# Patient Record
Sex: FEMALE | Race: WHITE
[De-identification: names, ages, dates, MRNs, and addresses within clinical notes are randomized per-mention and may not be internally consistent; named-entity substitution may affect disease eponyms.]

---

## 2017-04-03 ENCOUNTER — HOSPITAL ENCOUNTER (EMERGENCY)
Dept: HOSPITAL 68 - ERH | Age: 52
End: 2017-04-03
Payer: COMMERCIAL

## 2017-04-03 VITALS — HEIGHT: 68 IN | WEIGHT: 170 LBS | BODY MASS INDEX: 25.76 KG/M2

## 2017-04-03 VITALS — SYSTOLIC BLOOD PRESSURE: 118 MMHG | DIASTOLIC BLOOD PRESSURE: 82 MMHG

## 2017-04-03 DIAGNOSIS — Z72.0: ICD-10-CM

## 2017-04-03 DIAGNOSIS — J40: Primary | ICD-10-CM

## 2017-04-03 NOTE — RADIOLOGY REPORT
EXAMINATION:
XR CHEST
 
CLINICAL INFORMATION:
51-year-old patient with productive cough.
 
COMPARISON:
Chest x-ray on 11/26/2007.
 
TECHNIQUE:
PA and lateral erect views of the chest were obtained.
 
FINDINGS:
No significant abnormality is noted involving the heart, lungs, mediastinum,
bony thorax or soft tissues.
 
IMPRESSION:
No pneumonia.

## 2017-04-03 NOTE — ED DYSPNEA/ASTHMA COMPLAINT
History of Present Illness
 
General
Chief Complaint: Wheezing/Asthma
Stated Complaint: "IM HAVING HARD TIME BREATHING" SAT 94 P120
Source: patient
Exam Limitations: no limitations
 
Vital Signs & Intake/Output
Vital Signs & Intake/Output
 Vital Signs
 
 
Date Time Temp Pulse Resp B/P Pulse O2 O2 Flow FiO2
 
     Ox Delivery Rate 
 
 1606 96.9 92 18 118/82 94 Room Air  
 
 1519     97   
 
/ 1446     96 Room Air  
 
 1335 98.8 112 18 119/86 91 Room Air  
 
 
Allergies
Coded Allergies:
fish derived (UNKNOWN 17)
penicillin V (UNKNOWN 17)
 
Reconcile Medications
Albuterol Sulfate (Ventolin Hfa) 90 MCG HFA.AER.AD   2 PUF INH Q4-6 PRN PRN 
WHEEZING/SHORTNESS OF BREATH
Benzonatate 200 MG CAPSULE   1 CAP PO TID PRN cough
Methadone HCl 10 MG/ML ORAL.CONC   50 MG PO DAILY MAINTENCE  (Reported)
Methylprednisolone. (Medrol) 4 MG TAB.DS.PK   1 DP PO AD breathing
 
Triage Note:
C/O DIFFICULTY BREATHING SINCE SATURDAY.
 PRODUCTIVE COUGH- YELLOW SPUTUM. PT SHE HAD CHILLS
 ON AND OFF
Triage Nurses Notes Reviewed? yes
HPI:
Patient is a 51-year-old female presents complaining of sinus pressure, 
postnasal drip, cough with sputum production and shortness of breath.  Symptoms 
onset approximately 5 days ago.  Symptoms currently moderate.  Patient has been 
using her albuterol inhaler with mild improvement.  Also taking DayQuil with 
mild improvement.  Intermittent subjective fevers.  Patient denies sick contacts
, recent travel, chest pain, abdominal pain.
(SUZANNA HO)
 
Past History
 
Travel History
Traveled to [place] past 21 day No
 
Medical History
Any Pertinent Medical History? see below for history
Respiratory: asthma
Psychiatric: opioid dependence (on methadone)
 
Surgical History
Surgical History: non-contributory
 
Psychosocial History
What is your primary language English
Tobacco Use: Current Not Daily
Daily Tobacco Use Amount/Type: =< 4 Cigarettes daily
Illicit Drug Use: history of opiate abuse on methadone
 
Family History
Hx Contributory? No
(SUZANNA HO)
 
Review of Systems
 
Review of Systems
Constitutional:
Reports: fever, malaise. 
EENTM:
Reports: nasal congestion.  Denies: throat pain. 
Respiratory:
Reports: see HPI. 
Cardiovascular:
Denies: chest pain. 
GI:
Denies: abdominal pain, nausea, vomiting. 
Genitourinary:
Reports: no symptoms. 
Musculoskeletal:
Reports: no symptoms. 
Skin:
Reports: no symptoms. 
Neurological/Psychological:
Reports: no symptoms. 
Hematologic/Endocrine:
Reports: no symptoms. 
Immunologic/Allergic:
Reports: no symptoms. 
(SUZANNA HO)
 
Physical Exam
 
Physical Exam
General Appearance: well developed/nourished, alert, awake
Head: atraumatic, normal appearance, mild bilateral maxillary sinus tenderness.
Eyes:
Bilateral: normal appearance, PERRL, EOMI. 
Ears, Nose, Throat: normal pharynx
Neck: normal inspection, supple, full range of motion
Respiratory: diffuse moderate expiratory and expiratory wheezing and rhonchi.
Cardiovascular: regular rate/rhythm, no appreciable murmur
Extremities: normal inspection, normal capillary refill, normal range of motion
Neurologic/Psych: no motor/sensory deficits, awake, alert, oriented x 3, normal 
gait, normal mood/affect
Skin: intact, normal color, warm/dry
Lymphatic: no anterior cervical wiley
 
Core Measures
ACS in differential dx? No
Severe Sepsis Present: No
Septic Shock Present: No
(SUZANNA HO)
 
Progress
Differential Diagnosis: asthma, bronchitis, pneumonia
Plan of Care:
 Orders
 
 
Procedure Date/time Status
 
XRY-CHEST XRAY, PA AND LATERAL 1458 Active
 
 
 Current Medications
 
 
  Sig/Kristine Start time  Last
 
Medication Dose  Stop Time Status Admin
 
Albuterol Sulfate 3 ML ONCE ONE  1500 UNVr 
 
(Proventil)    1501  
 
Ipratropium Bromide 2.5 ML ONCE ONE  1500 UNVr 
 
(Atrovent)    1501  
 
Prednisone 60 MG ONCE ONE  1500 UNVr 
 
   / 1501  
 
 
4/3/2017 3:46:28 PM: Results of chest x-ray discussed with patient.  Patient 
afebrile, nontoxic-appearing, appears stable for discharge.  Patient instructed 
to follow-up with her primary doctor if no improvement within 1-2 days.
(SUZANNA HO)
Diagnostic Imaging:
Viewed by Me: Radiology Read.  Discussed w/RAD: Radiology Read. 
CXR Impression: PATIENT: ILENE GONZALEZ  MEDICAL RECORD NO: [id number] PRESENT AGE:
51  PATIENT ACCOUNT NO: [id number] : 67  LOCATION: [facility] ORDERING PHYSICIAN:
SUZANNA CORTEZ     SERVICE DATE:  EXAM TYPE: RAD - XRY-CHEST 
XRAY, PA AND LATERAL EXAMINATION: XR CHEST CLINICAL INFORMATION: 51-year-old 
patient with productive cough. COMPARISON: Chest x-ray on 2007. TECHNIQUE:
PA and lateral erect views of the chest were obtained. FINDINGS: No significant 
abnormality is noted involving the heart, lungs, mediastinum, bony thorax or 
soft tissues. IMPRESSION: No pneumonia. DICTATED BY: STEVAN ADLER MD  DATE/
TIME DICTATED:17 :RAD.[facility]  DATE/TIME TRANSCRIBED:
17 CONFIDENTIAL, DO NOT COPY WITHOUT APPROPRIATE AUTHORIZATION.  <
Electronically signed in Other Vendor System>                                   
                                                    SIGNED BY: STEVAN ADLER MD 17 1536
Initial ED EKG: none
(SUZANNA HO)
 
Departure
 
Departure
Time of Disposition: 
Disposition: HOME OR SELF CARE
Condition: Stable
Clinical Impression
Primary Impression: Bronchitis
Referrals:
LEDA TAYLOR,MARYA ECHEVERRIA (PCP/Family)
 
Additional Instructions:
Drink plenty of fluids and rest.  Use the albuterol inhaler as directed.  Return
to the emergency department if breathing worsening, unable stay hydrated, or 
worsening of symptoms.
Departure Forms:
Customer Survey
General Discharge Information
Prescriptions:
Current Visit Scripts
Methylprednisolone. (Medrol) 1 DP PO AD 
     #1 DP 
 
Benzonatate 1 CAP PO TID PRN cough
     #30 CAP 
 
Albuterol Sulfate (Ventolin Hfa) 2 PUF INH Q4-6 PRN PRN WHEEZING/SHORTNESS OF 
BREATH
     #1 INHAL 
 
 
(SUZANNA HO)
 
PA/NP Co-Sign Statement
Statement:
ED Attending supervision documentation-
 
[] I saw and evaluated the patient. I have also reviewed all the pertinent lab 
results and diagnostic results. I agree with the findings and the plan of care 
as documented in the PA's/NP's documentation. 
 
[X] I have reviewed the ED Record and agree with the PA's/NP's documentation.
 
[] Additions or exceptions (if any) to the PAs/NP's note and plan are 
summarized below:
[]
 
(KATELYNN MENDEZ DO)
 
Critical Care Note
 
Critical Care Note
Critical Care Time: non-applicable
(SUZANNA HO)

## 2018-06-04 ENCOUNTER — HOSPITAL ENCOUNTER (EMERGENCY)
Dept: HOSPITAL 68 - ERH | Age: 53
End: 2018-06-04
Payer: COMMERCIAL

## 2018-06-04 VITALS — SYSTOLIC BLOOD PRESSURE: 154 MMHG | DIASTOLIC BLOOD PRESSURE: 95 MMHG

## 2018-06-04 DIAGNOSIS — F17.210: ICD-10-CM

## 2018-06-04 DIAGNOSIS — J40: Primary | ICD-10-CM

## 2018-06-04 DIAGNOSIS — J45.909: ICD-10-CM

## 2018-06-04 NOTE — ED DYSPNEA/ASTHMA COMPLAINT
History of Present Illness
 
General
Chief Complaint: Wheezing/Asthma
Stated Complaint: "ASTHMA ATTACK; SPO2 94%"
Source: patient
Exam Limitations: no limitations
 
Vital Signs & Intake/Output
Vital Signs & Intake/Output
 Vital Signs
 
 
Date Time Temp Pulse Resp B/P B/P Pulse O2 O2 Flow FiO2
 
     Mean Ox Delivery Rate 
 
06/04 0430 98.0 92 24 154/95  94 Room Air  
 
 
 
Allergies
Coded Allergies:
fish derived (UNKNOWN 04/03/17)
penicillin V (UNKNOWN 04/03/17)
 
Reconcile Medications
Albuterol Sulfate (Ventolin Hfa) 90 MCG HFA.AER.AD   2 PUF INH Q4-6 PRN PRN 
WHEEZING/COUGH
Albuterol Sulfate (Ventolin Hfa) 90 MCG HFA.AER.AD   2 PUF INH Q4-6 PRN PRN 
WHEEZING/SHORTNESS OF BREATH
Azithromycin (Zithromax) 250 MG TABLET   1 DP PO AD BRONCHITIS
     FIRST DOSE OF Z-PACK GIVEN IN ed
Benzonatate (Tessalon Perle) 100 MG CAPSULE   1 CAP PO TID PRN COUGH
Benzonatate 200 MG CAPSULE   1 CAP PO TID PRN cough
Methadone HCl 10 MG/ML ORAL.CONC   50 MG PO DAILY MAINTENCE  (Reported)
Methylprednisolone. (Medrol) 4 MG TAB.DS.PK   1 DP PO AD breathing
Prednisone 50 MG TABLET   1 TAB PO DAILY BRONCHITIS
 
Triage Note:
PER PT SOB ALL DAY HX OF ASTHMA, REPORTS
 NONPRODUCTIVE COUGH +SMOKER BUT NONE TODAY
Triage Nurses Notes Reviewed? yes
Onset: Gradual
Duration: day(s):, waxing and waning
Timing: recent history
Severity: moderate
Activities at Onset: none
Prior Episodes/Possible Cause: occasional episodes
Associated Symptoms: cough, wheezing
HPI:
52-year-old woman history of asthma current cigarette smoker presents with 7 
days of cough and wheeze.  She notes that the past 2 days her wheezing has 
gotten worse she feels like there is phlegm in her chest.  Tonight he grew 
worse.  She has no fever chills chest pain lower extremity swelling.  She is 
otherwise well and has no other concerns.
 
Past History
 
Travel History
Traveled to [place] past 21 day No
 
Medical History
Any Pertinent Medical History? see below for history
Neurological: NONE
EENT: NONE
Cardiovascular: NONE
Respiratory: asthma
Gastrointestinal: NONE
Hepatic: NONE
Renal: NONE
Musculoskeletal: NONE
Psychiatric: opioid dependence (on methadone)
Endocrine: NONE
Blood Disorders: NONE
Cancer(s): NONE
GYN/Reproductive: NONE
 
Surgical History
Surgical History: non-contributory
 
Psychosocial History
What is your primary language English
Tobacco Use: Current Daily Use
Daily Tobacco Use Amount/Type: => 5 Cigarettes daily
 
Family History
Hx Contributory? No
 
Review of Systems
 
Review of Systems
Constitutional:
Reports: no symptoms. 
EENTM:
Reports: no symptoms. 
Respiratory:
Reports: no symptoms. 
Cardiovascular:
Reports: no symptoms. 
GI:
Reports: no symptoms. 
Genitourinary:
Reports: no symptoms. 
Musculoskeletal:
Reports: no symptoms. 
Skin:
Reports: no symptoms. 
Neurological/Psychological:
Reports: no symptoms. 
Hematologic/Endocrine:
Reports: no symptoms. 
Immunologic/Allergic:
Reports: no symptoms. 
All Other Systems: Reviewed and Negative
 
Physical Exam
 
Physical Exam
Respiratory: see below
Comments:
Review of Systems - except as otherwise noted in HPI
 
All Other Systems: Reviewed and Negative
 
Physical Exam
 
Physical Exam
General Appearance: well developed/nourished, no apparent distress
Head: atraumatic, normal appearance
Eyes:
Bilateral: normal appearance. 
Ears, Nose, Throat: normal pharynx, normal ENT inspection
Neck: normal inspection, supple, full range of motion
Respiratory: normal breath sounds, chest non-tender, no respiratory distress, 
quiet respiration, bilateral wheezes with slight rhonchi and slightly prolonged 
expiratory phase
Cardiovascular: regular rate/rhythm
Gastrointestinal: normal bowel sounds, soft, non-tender, no organomegaly
Back: normal inspection, normal range of motion
Extremities: normal inspection, normal capillary refill, normal range of motion,
no edema
Neurologic/Psych: no motor/sensory deficits, awake, alert, oriented x 3
Skin: intact, normal color, warm/dry
 
Core Measures
ACS in differential dx? No
CVA/TIA Diagnosis No
Sepsis Present: No
Sepsis Focused Exam Completed? No
 
Progress
Differential Diagnosis: asthma, bronchitis, COPD
Plan of Care:
 Current Medications
 
 
  Sig/Kristine Start time  Last
 
Medication Dose  Stop Time Status Admin
 
Albuterol Sulfate 3 ML ONCE ONE 06/04 0445 AC 
 
(Proventil)   06/04 0446  
 
Azithromycin 500 MG ONCE ONE 06/04 0445 UNVr 
 
(Zithromax)   06/04 0446  
 
Ipratropium Bromide 2.5 ML ONCE ONE 06/04 0445 AC 
 
(Atrovent)   06/04 0446  
 
Prednisone 60 MG ONCE ONE 06/04 0445 UNVr 
 
   06/04 0446  
 
 
 
Initial ED EKG: none
 
Departure
 
Departure
Disposition: HOME OR SELF CARE
Condition: Stable
Clinical Impression
Primary Impression: Bronchitis
Secondary Impressions: Asthma
Referrals:
Holly TAYLOR,Everton ECHEVERRIA (PCP/Family)
 
Departure Forms:
Customer Survey
General Discharge Information
Prescriptions:
Current Visit Scripts
Prednisone 1 TAB PO DAILY  
     #4 TAB 
 
Azithromycin (Zithromax) 1 DP PO AD  
     #4 TAB 
     FIRST DOSE OF Z-PACK GIVEN IN ed
 
Albuterol Sulfate (Ventolin Hfa) 2 PUF INH Q4-6 PRN PRN WHEEZING/COUGH 
     #1 INHAL Ref 1
 
Benzonatate (Tessalon Perle) 1 CAP PO TID PRN COUGH 
     #30 CAP 
 
 
Comments
6\4\18, 5:25AM... feeling better after supportive medications... encouraged 
close follow up... wrote for abx, steroids, albuterol.  encouraged smoking 
cessation. 
 
Critical Care Note
 
Critical Care Note
Critical Care Time: non-applicable

## 2018-06-08 ENCOUNTER — HOSPITAL ENCOUNTER (EMERGENCY)
Dept: HOSPITAL 68 - ERH | Age: 53
End: 2018-06-08
Payer: COMMERCIAL

## 2018-06-08 VITALS — DIASTOLIC BLOOD PRESSURE: 79 MMHG | SYSTOLIC BLOOD PRESSURE: 140 MMHG

## 2018-06-08 VITALS — HEIGHT: 68 IN | WEIGHT: 150 LBS | BODY MASS INDEX: 22.73 KG/M2

## 2018-06-08 DIAGNOSIS — J44.1: Primary | ICD-10-CM

## 2018-06-08 DIAGNOSIS — F17.210: ICD-10-CM

## 2018-06-08 LAB
ABSOLUTE GRANULOCYTE CT: 6.7 /CUMM (ref 1.4–6.5)
BASOPHILS # BLD: 0 /CUMM (ref 0–0.2)
BASOPHILS NFR BLD: 0 % (ref 0–2)
EOSINOPHIL # BLD: 0 /CUMM (ref 0–0.7)
EOSINOPHIL NFR BLD: 0 % (ref 0–5)
ERYTHROCYTE [DISTWIDTH] IN BLOOD BY AUTOMATED COUNT: 14 % (ref 11.5–14.5)
GRANULOCYTES NFR BLD: 91.4 % (ref 42.2–75.2)
HCT VFR BLD CALC: 41.6 % (ref 37–47)
LYMPHOCYTES # BLD: 0.5 /CUMM (ref 1.2–3.4)
MCH RBC QN AUTO: 28.4 PG (ref 27–31)
MCHC RBC AUTO-ENTMCNC: 33.5 G/DL (ref 33–37)
MCV RBC AUTO: 84.9 FL (ref 81–99)
MONOCYTES # BLD: 0.1 /CUMM (ref 0.1–0.6)
PLATELET # BLD: 248 /CUMM (ref 130–400)
PMV BLD AUTO: 5.9 FL (ref 7.4–10.4)
RED BLOOD CELL CT: 4.9 /CUMM (ref 4.2–5.4)
WBC # BLD AUTO: 7.3 /CUMM (ref 4.8–10.8)

## 2018-06-08 NOTE — ED DYSPNEA/ASTHMA COMPLAINT
History of Present Illness
 
General
Chief Complaint: Dyspnea (COPD, CHF, Other)
Stated Complaint: SOB
Source: patient, old records
Exam Limitations: no limitations
 
Vital Signs & Intake/Output
Vital Signs & Intake/Output
 Vital Signs
 
 
Date Time Temp Pulse Resp B/P B/P Pulse O2 O2 Flow FiO2
 
     Mean Ox Delivery Rate 
 
06/08 1916 98.9 87 20 140/79  93 Room Air  
 
06/08 1628       Room Air  
 
06/08 1544      89   
 
06/08 1525   22   89 Room Air Room Air 
 
06/08 1524 97.9 103 18 157/98  90 Room Air  
 
 
 
Allergies
Coded Allergies:
penicillin V (UNKNOWN 04/03/17)
shellfish derived (UNKNOWN 06/08/18)
 
Reconcile Medications
Albuterol Sulfate (Proventil Hfa) 90 MCG HFA.AER.AD   2 PUF INH Q4 COPD
Prednisone 20 MG TABLET   2 TAB PO DAILY COPD
 
Triage Note:
51 YO FEMALE TO TRIAGE C/O SOB. STATES WAS HERE ON
 MONDAY AND WAS GIVEN PREDNISONE AND ANTITIOICS.
 STATES SHE FEELS NO BETTER. PT SOB IN TRIAGE, RA
 SATS 90%. PT ABLE TO SPEAK FULL, CLEAR SENTANCES
 WITHOUT DIFFICULTIES. PA IN TRIAGE FOR EVAL
Triage Nurses Notes Reviewed? yes
HPI:
52F PMH asthma, active smoker presents with shortness of breath, dyspnea on 
exertion, fatigue, and wheezing.  Came to ER for an asthma exacerbation 4 days 
ago, given nebulizer treatment and discharged on Prednisone and Azithromycin, 
which she has taken.  Since then, she is still very short of breath in the 
mornings, and becomes short of breath with minimal exertion.  She denies chest 
pain, palpitations, lightheadedness.  She has no personal or family cardiac 
history.  Never been intubated, speaking in full sentences though becomes 
visibly dyspneic when speaking uninterrupted for some time.
 
Past History
 
Travel History
Traveled to [place] past 21 day No
 
Medical History
Any Pertinent Medical History? see below for history
Neurological: NONE
EENT: NONE
Cardiovascular: NONE
Respiratory: asthma
Gastrointestinal: NONE
Hepatic: NONE
Renal: NONE
Musculoskeletal: NONE
Psychiatric: opioid dependence (on methadone)
Endocrine: NONE
Blood Disorders: NONE
Cancer(s): NONE
GYN/Reproductive: NONE
 
Surgical History
Surgical History: non-contributory
 
Psychosocial History
What is your primary language English
Tobacco Use: Current Daily Use
Daily Tobacco Use Amount/Type: =< 4 Cigarettes daily
 
Family History
Hx Contributory? No
 
Review of Systems
 
Review of Systems
Constitutional:
Reports: no symptoms. 
EENTM:
Reports: no symptoms. 
Respiratory:
Reports: see HPI. 
Cardiovascular:
Reports: no symptoms. 
GI:
Reports: no symptoms. 
Genitourinary:
Reports: no symptoms. 
Musculoskeletal:
Reports: no symptoms. 
Skin:
Reports: no symptoms. 
Neurological/Psychological:
Reports: no symptoms. 
Hematologic/Endocrine:
Reports: no symptoms. 
Immunologic/Allergic:
Reports: no symptoms. 
All Other Systems: Reviewed and Negative
 
Physical Exam
 
Physical Exam
General Appearance: well developed/nourished, no apparent distress
Head: atraumatic, normal appearance
Eyes:
Bilateral: normal appearance. 
Ears, Nose, Throat: hearing grossly normal
Neck: normal inspection, supple, full range of motion
Respiratory: no respiratory distress, wheezing
Cardiovascular: regular rate/rhythm
Gastrointestinal: soft, non-tender
Extremities: normal inspection, normal range of motion
Neurologic/Psych: awake, alert, oriented x 3, normal mood/affect
Skin: intact, normal color, warm/dry
 
Core Measures
ACS in differential dx? Yes
CVA/TIA Diagnosis No
Sepsis Present: No
Sepsis Focused Exam Completed? No
 
Progress
Differential Diagnosis: asthma, AMI, bronchitis, CHF, COPD, pneumonia
Plan of Care:
 Orders
 
 
Procedure Date/time Status
 
Regular Diet 06/09 B Active
 
TROPONIN LEVEL 06/08 1830 Complete
 
EKG 06/08 1830 Active
 
TROPONIN LEVEL 06/08 1526 Complete
 
D-DIMER 06/08 1526 Complete
 
COMPREHENSIVE METABOLIC PANEL 06/08 1526 Complete
 
CBC WITHOUT DIFFERENTIAL 06/08 1526 Complete
 
EKG 06/08 1526 Active
 
 
 Laboratory Tests
 
 
 
06/08/18 1854:
Troponin I < 0.01
 
06/08/18 1548:
Anion Gap 13, Estimated GFR > 60, BUN/Creatinine Ratio 21.3, Glucose 109  H, 
Calcium 9.8, Total Bilirubin 0.5, AST 24, ALT 22, Alkaline Phosphatase 121, 
Troponin I < 0.01, Total Protein 7.7, Albumin 4.4, Globulin 3.3, Albumin/
Globulin Ratio 1.3, D-Dimer High Sensitivty < 200, CBC w Diff NO MAN DIFF REQ, 
RBC 4.90, MCV 84.9, MCH 28.4, MCHC 33.5, RDW 14.0, MPV 5.9  L, Gran % 91.4  H, 
Lymphocytes % 7.2  L, Monocytes % 1.4  L, Eosinophils % 0, Basophils % 0, 
Absolute Granulocytes 6.7  H, Absolute Lymphocytes 0.5  L, Absolute Monocytes 
0.1, Absolute Eosinophils 0, Absolute Basophils 0
 
Low suspicion for ACS at this time. Patient is markedly improved and 
asymptomatic.  Will discharge On Prednisone.  Strongly advised admission however
patient adamantly refused.  She understands that she risks worsening breathing, 
respiratory failure, MI, ACS, death.   She will return to ER if her symptoms 
worsen.
Initial ED EKG: NSR, TWI in anterior leads
Repeat EKG: unchanged
 
Departure
 
Departure
Disposition: HOME OR SELF CARE
Condition: Stable
Clinical Impression
Primary Impression: COPD exacerbation
Referrals:
Holly TAYLOR,Everton ECHEVERRIA (PCP/Family)
 
Additional Instructions:
Follow up with your PCP.  Continue Prednisone.  Return to ER if new or worsening
symptoms.
Departure Forms:
Customer Survey
General Discharge Information
Prescriptions:
Current Visit Scripts
Prednisone 2 TAB PO DAILY  
     #10 TAB 
 
Albuterol Sulfate (Proventil Hfa) 2 PUF INH Q4  
     #1 INHAL 
 
 
 
Critical Care Note
 
Critical Care Note
Critical Care Time: non-applicable

## 2018-06-08 NOTE — RADIOLOGY REPORT
EXAMINATION:
XR CHEST
 
CLINICAL INFORMATION:
Shortness of breath.
 
COMPARISON:
Chest x-ray 04/03/2017
 
TECHNIQUE:
2 views of the chest were obtained.
 
FINDINGS:
No significant abnormality is noted involving the heart, lungs, mediastinum,
bony thorax or soft tissues.
 
IMPRESSION:
Unremarkable examination.